# Patient Record
(demographics unavailable — no encounter records)

---

## 2024-12-31 NOTE — HISTORY OF PRESENT ILLNESS
[de-identified] : Chief Complaint: Low back pain   History of Present Illness: 61-year-old female presenting today for evaluation of the low back pain.  She states that she has had back pain on and off for years feels that it is getting more frequent.  She has a baseline pain 2-3 out of 10 in low back lower lumbar spine aching in nature appears to be worse with activity worse with bending twisting lifting.  She will have flareups she states 3-4 throughout the year that cause her significant discomfort and pain.  She denies any lower extremity radicular symptoms.  So far it does not affect her quality of life she mainly would like to establish a baseline   Past medical history, past surgical history, medications, allergies, social history, and family history are as documented in our records today.  Notable items include: None   Review of Systems: I have reviewed the patient's documented Review of Systems data today, I concur with this documentation.

## 2024-12-31 NOTE — PHYSICAL EXAM
[de-identified] : CONSTITUTIONAL: Patient is a very pleasant individual who is well-nourished and appears stated age. PSYCHIATRIC: Alert and oriented times three and in no apparent distress, and participates with orthopedic evaluation well. HEAD: Atraumatic and nonsyndromic in appearance. EENT: No thyromegaly, EOMI. RESPIRATORY: Respiratory rate is regular, not dyspneic on examination. LYMPHATICS: There is no cervical or axillary lymphadenopathy. INTEGUMENTARY: Skin is clean, dry, and intact to bilateral lower extremities. VASCULAR: There is brisk capillary refill about the bilateral Lower Extremities with 2+ DP Pulse  Palpation: No significant tenderness to the lower lumbar spine Muscle Strength Testing: Hip Flexion: 5/5 B/L Knee Extension: 5/5 B/L Knee Flexion: 5/5 B/L Dorsiflexion: 5/5 B/L EHL: 5/5 B/L Plantarflexion: 5/5 B/L  Sensation: SILT L2-S1 B/L except: None  Reflexes: 2+ Quadriceps/Achilles  Gait: Normal gait w/o assistance Able to perform tandem gait Able to Heel Walk Able to Toe Walk  Special Testing:  Negative SLR BLLE Negative clonus BLLE  [de-identified] : Standing AP, lateral, flexion and extension radiographs of the lumbar spine performed on 12/31/2024 in the Radiology Department at [facility] for the indication of low back pain are reviewed.  These studies demonstrate no deformity on AP film there is no significant osteoarthritis bilateral hips lateral radiographs demonstrates well-maintained lumbar lordosis signs of facet arthropathy spondylosis L4-5 L5-S1.

## 2024-12-31 NOTE — ASSESSMENT
[FreeTextEntry1] : 61-year-old female with lumbar spondylosis  The patient and I had an extensive discussion today regarding her concerns.  At present, I am not recommending a surgical intervention.  We discussed non-surgical options that may be available to the patient.  Conservative treatment was discussed with the patient at length. Anticipatory guidance regarding disease process, avoidance of acute exacerbation this was discussed at length and all patients commenting concerns were answered to the patient's satisfaction. Physical therapy for decrease pain and increase function was ordered. Intermittent use of acetaminophen 500 mg 2 tablets t.i.d. p.r.n. mild to moderate pain, ibuprofen 600 mg t.i.d. p.r.n. severe pain with food or milk if there is no medical contraindication. Home exercise including stretching on a daily basis for 20-30 minutes was recommended. Heat, ice, topical were discussed as needed.

## 2025-01-08 NOTE — REVIEW OF SYSTEMS
Prepped: groin. Prepped with: ChloraPrep. The patient was draped.  [Ear Noises] : ear noises [Patient Intake Form Reviewed] : Patient intake form was reviewed [Negative] : Ear [Ear Pain] : no ear pain [Ear Itch] : no ear itch [Hearing Loss] : no hearing loss [Dizziness] : no dizziness [Vertigo] : no vertigo [Recurrent Ear Infections] : no recurrent ear infections [Lightheadedness] : no lightheadedness [Ear Drainage] : no ear drainage [de-identified] : ringing in both ears

## 2025-01-08 NOTE — REASON FOR VISIT
[Initial Consultation] : an initial consultation for [Tinnitus] : tinnitus [FreeTextEntry2] : ears check

## 2025-01-08 NOTE — DATA REVIEWED
[de-identified] :  Type A tymps, AU. Testing via inserts: WNL up to 2kHz, sloping to a mild to moderate SNHL, AU. Note 20dB asymmetry at 4kHz; the left ear is worse. Recs: 1) F/u w/ MD 2) Further tests as per MD 3) Continue wearing hearing aids 4) Annual

## 2025-02-05 NOTE — HISTORY OF PRESENT ILLNESS
[de-identified] : Age: 61 year F PMHx: HTN, HLD Hand Dominance: RHD Chief Complaint:  Right wrist pain onset approx. 02/04/25. Patient reports her symptoms onset with no precipitating factors. Patient reports that she is having pain in the base of her right thumb, worse with grasping and fine motor movements. Patient also notes pain with pro/sup of the right wrist. Denies numbness/tingling at this time. Taking tylenol PRN with little to no relief. Denies any recent imaging. Trauma: NKI Outside Imaging/Treatment: none OTC Medications: Tylenol PRN with little to no relief OT/PT: none Bracing: none Pain worse with: grasping, fine motor movements Pain better with: rest

## 2025-02-05 NOTE — ASSESSMENT
[FreeTextEntry1] : EXAM Right hand with no swelling nor erythema. Able to make a composite fist, oppose thumb to small finger with good thenar bulk, no intrinsic atrophy. No tenderness at radial styloid or A1 pulley. +grind. Stable thumb MCP with tenderness at thumb CMC. Sensation intact throughout. <2sec cap refill.  Right wrist radiographs demonstrate Stage II thumb arthritis. Mild lunate sclerosis (possible Keinbock's), ulnar styloid chronic osseous fragment. No fracture nor dislocation.  ASSESSMENT & PLAN Right thumb CMC arthritis - reviewed radiographs and discussed pathoanatomy with patient. Discussed treatment ladder including NSAIDs, bracing (shown MetaGrip), hand therapy, CSI and basal joint arthroplasty.  F/u 6 months